# Patient Record
Sex: FEMALE | Race: BLACK OR AFRICAN AMERICAN
[De-identification: names, ages, dates, MRNs, and addresses within clinical notes are randomized per-mention and may not be internally consistent; named-entity substitution may affect disease eponyms.]

---

## 2023-05-24 ENCOUNTER — HOSPITAL ENCOUNTER (EMERGENCY)
Dept: HOSPITAL 15 - ER | Age: 23
Discharge: LEFT BEFORE DISCHARGE | End: 2023-05-24
Payer: COMMERCIAL

## 2023-05-24 DIAGNOSIS — F32.9: ICD-10-CM

## 2023-05-24 DIAGNOSIS — F41.9: Primary | ICD-10-CM

## 2023-05-24 PROCEDURE — 80320 DRUG SCREEN QUANTALCOHOLS: CPT

## 2023-05-24 PROCEDURE — 36415 COLL VENOUS BLD VENIPUNCTURE: CPT

## 2023-05-29 ENCOUNTER — HOSPITAL ENCOUNTER (EMERGENCY)
Dept: HOSPITAL 15 - ER | Age: 23
Discharge: HOME | End: 2023-05-29
Payer: MEDICAID

## 2023-05-29 VITALS — BODY MASS INDEX: 20.42 KG/M2 | WEIGHT: 130.07 LBS | HEIGHT: 67 IN

## 2023-05-29 VITALS — SYSTOLIC BLOOD PRESSURE: 107 MMHG | DIASTOLIC BLOOD PRESSURE: 71 MMHG

## 2023-05-29 DIAGNOSIS — R30.0: Primary | ICD-10-CM

## 2023-05-29 DIAGNOSIS — Z79.899: ICD-10-CM

## 2023-05-29 DIAGNOSIS — Z88.2: ICD-10-CM

## 2023-05-29 DIAGNOSIS — F32.9: ICD-10-CM

## 2023-05-29 DIAGNOSIS — F41.9: ICD-10-CM

## 2023-05-29 PROCEDURE — 81025 URINE PREGNANCY TEST: CPT

## 2023-05-29 PROCEDURE — 81001 URINALYSIS AUTO W/SCOPE: CPT

## 2024-12-28 ENCOUNTER — HOSPITAL ENCOUNTER (EMERGENCY)
Dept: HOSPITAL 15 - ER | Age: 24
LOS: 1 days | Discharge: HOME | End: 2024-12-29
Payer: MEDICAID

## 2024-12-28 VITALS
OXYGEN SATURATION: 100 % | RESPIRATION RATE: 18 BRPM | HEART RATE: 101 BPM | DIASTOLIC BLOOD PRESSURE: 75 MMHG | SYSTOLIC BLOOD PRESSURE: 112 MMHG

## 2024-12-28 VITALS — WEIGHT: 119.05 LBS | BODY MASS INDEX: 19.13 KG/M2 | HEIGHT: 66 IN

## 2024-12-28 DIAGNOSIS — Z79.2: ICD-10-CM

## 2024-12-28 DIAGNOSIS — N39.0: ICD-10-CM

## 2024-12-28 DIAGNOSIS — Z79.899: ICD-10-CM

## 2024-12-28 DIAGNOSIS — F41.9: Primary | ICD-10-CM

## 2024-12-28 DIAGNOSIS — Z59.00: ICD-10-CM

## 2024-12-28 LAB — PROT UR STRIP.AUTO-MCNC: (no result) MG/DL

## 2024-12-28 PROCEDURE — 81001 URINALYSIS AUTO W/SCOPE: CPT

## 2024-12-28 PROCEDURE — 81025 URINE PREGNANCY TEST: CPT

## 2024-12-28 SDOH — ECONOMIC STABILITY - HOUSING INSECURITY: HOMELESSNESS UNSPECIFIED: Z59.00

## 2024-12-28 NOTE — ED.PDOC
History of Present Illness


HPI Comments


A 24 year old female presents to the ED with a chief complaint of sore throat 

onset 2 weeks. Patient states she has been experiencing sore throat and body 

aches for the past 2 weeks. Patient is also concerned for possible STD, states 

her skin turns brown and dry when she has an STD. Patient's LMP was 5 days ago 

and is concerned for possible pregnancy. Past medical history and anxiety and 

depression. Denies fever, cough, chest pain, shortness of breath, diarrhea, 

nausea, vomiting, constipation. No other symptoms or modifying factors present 

at this time.


Time Seen by MD:  17:58


Primary Care Provider:  SARITA


Reviewed Notes:  Medications, Allergies


Allergies:  


Coded Allergies:  


     NO KNOWN ALLERGIES (Unverified , 8/12/21)


Home Meds


Active Scripts


Naproxen (Naproxen) 500 Mg Tab, 500 MG PO BID, #24 TAB


   Prov:ODETTE FREIRE         9/28/22


Sulfamethoxazole W/Trimethopri (Bactrim Ds Tablet) 1 Tab Tb, 1 TAB PO BID for 8 

Days, #16 TAB


   Prov:ODETTE FREIRE         9/28/22


Hydroxyzine Pamoate (Vistaril) 50 Mg Cap, 50 MG PO QHSP PRN, #20 CAP


   Prov:ODETTE FREIRE         3/12/22


Doxycycline (Monohydrate) (DOXYCYCLINE) 150 Mg Cap, 150 MG PO BID for 7 Days, 

#14 CAP 0 Refills


   Prov:TYE JIMÉNEZ [facility]         2/27/22


Information Source:  Patient


Mode of Arrival:  Ambulatory


Severity:  Moderate


Timing:  Weeks


Duration:  Since onset


Prehospital treatment:  None





Past Medical History


PAST MEDICAL HISTORY:  Anxiety, Depression


Surgical History:  Denies all surgeries


GYN History:  No Pertinent GYN History





Family History


Family History:  Unknown





Social History


Smoker:  Non-Smoker


Alcohol:  Denies ETOH Use


Drugs:  Denies Drug Use


Lives In:  Homeless





Constitutional:  reports: others (body aches); denies: chills, diaphoresis, 

fatigue, fever, malaise, sweats, weakness


EENTM:  reports: throat pain; denies: blurred vision, double vision, ear 

bleeding, ear discharge, ear drainage, ear pain, ear ringing, eye pain, eye red

ness, hearing loss, mouth pain, mouth swelling, nasal discharge, nose bleeding, 

nose congestion, nose pain, photophobia, tearing, throat swelling, voice 

changes, others


Respiratory:  denies: cough, hemoptysis, orthopnea, SOB at rest, shortness of 

breath, SOB with excertion, stridor, wheezing, others


Cardiovascular:  denies: chest pain, dizzy spells, diaphoresis, Dyspnea on 

exertion, edema, irregular heart beat, left arm pain, lightheadedness, 

palpitations, PND, syncope, others


Gastrointestinal:  denies: abdomen distended, abdominal pain, blood streaked 

bowels, constipated, diarrhea, dysphagia, difficulty swallowing, hematemesis, 

melena, nausea, poor appetite, poor fluid intake, rectal bleeding, rectal pain, 

vomiting, others


Genitourinary:  reports: vagina discharge; denies: abnormal vagina bleeding, 

burning, dyspareunia, dysuria, flank pain, frequency, hematuria, incontinence, 

pain, pregnant, urgency, others


Neurological:  denies: dizziness, fainting, headache, left sided numbness, left 

sided weakness, numbness, paresthesia, pre-existing deficit, right sided 

numbness, right sided weakness, seizure, speech problems, tingling, tremors, we

akness, others


Musculoskeletal:  denies: back pain, gout, joint pain, joint swelling, muscle 

pain, muscle stiffness, neck pain, others


Integumetry:  denies: bruises, change in color, change in hair/nails, dryness, 

laceration, lesions, lumps, rash, wounds, others


Allergic/Immunocompromised:  denies: Difficulty Healing, Frequent Infections, 

Hives, Itching, others


Hematologic/Lymphatic:  denies: anemia, blood clots, easy bleeding, easy 

bruising, swollen glands, others


Endocrine:  denies: excessive hunger, excessive sweating, excessive thirst, 

excessive urination, flushing, intolerance to cold, intolerance to heat, 

unexplained weight gain, unexplained weight loss, others


Psychiatric:  denies: anxiety, bipolar disorder, depression, hopeless, panic 

disorder, schizophrenia, sleepless, suicidal, others


All Other Systems:  Reviewed and Negative





Physical Exam


General Appearance:  No Apparent Distress, Normal


HEENT:  Normal ENT Inspection, Pharynx Normal, TMs Normal


Neck:  Full Range of Motion, Non-Tender, Normal, Normal Inspection


Respiratory:  Chest Non-Tender, Lungs Clear, No Accessory Muscle Use, No 

Respiratory Distress, Normal Breath Sounds


Cardiovascular:  No Edema, No JVD, No Murmur, No Gallop, Normal Peripheral 

Pulses, Regular Rate/Rhythm


Breast Exam:  Deferred


Gastrointestinal:  No Organomegaly, Non Tender, No Pulsatile Mass, Normal Bowel 

Sounds, Soft


Genitalia:  Deferred


Pelvic:  Deferred


Rectal:  Deferred


Extremities:  No calf tenderness, Normal capillary refill, Normal inspection, 

Normal range of motion, Non-tender, No pedal edema


Musculoskeletal :  


   Apperance:  Normal


Neurologic:  Alert, CNs II-XII nml as Tested, No Motor Deficits, Normal Affect, 

Normal Mood, No Sensory Deficits


Cerebellar Function:  Normal


Reflexes:  Normal


Skin:  Dry, Normal Color, Warm


Lymphatic:  No Adenopathy





Was a procedure done?


Was a procedure done?:  No





Differential Dx


Considerations may include:


sti, anxiety, pregnancy, uti, viral syndrome





X-Ray, Labs, Meds, VS





                                   Vital Signs








  Date Time  Temp Pulse Resp B/P (MAP) Pulse Ox O2 Delivery O2 Flow Rate FiO2


 


12/28/24 18:05 98.1 101 18 112/75 (87) 100   








                                       Lab








Test


 12/28/24


18:32 Range/Units


 


 


Urine Color Yellow  Yellow  


 


Urine Clarity Clear  Clear  


 


Urine pH 6.0  5.0-9.0  


 


Urine Specific Gravity 1.033  1.001-1.035  


 


Urine Protein Trace H Negative  


 


Urine Ketones Trace  Negative  


 


Urine Blood 2+ H Negative  /uL


 


Urine Nitrite Negative  Negative  


 


Urine Bilirubin Negative  Negative  


 


Urine Urobilinogen 2 H Negative  mg/dL


 


Urine Leukocyte Esterase Trace  Negative  /uL


 


Urine RBC 6  0 - 4  /hpf


 


Urine WBC 5  0 - 5  /hpf


 


Urine Squamous Epithelial


Cells Few 


 <5  /hpf





 


Urine Bacteria Few H None Seen  /hpf


 


Urine Mucus Few  None Seen  


 


Urine Glucose Normal  Normal  mg/dL


 


Urine Pregnancy Test See comment A Negative  


 


Chlamydia trachomatis (OUSMANE) Pending   


 


Neisseria gonorrhoeae (OUSMANE) Pending   








Time of 1ST Reevaluation:  18:28


Reevaluation 1ST:  Unchanged


Patient Education/Counseling:  Diagnosis, Treatment, Prognosis, Need For Follow 

Up


Family Education/Counseling:  No Family Present


Additional Information


I reviewed the following notes from patient's past medical encounters:





The following tests were ordered, and results were reviewed by me: UA, PREGUA, 

Chlamydia/GC amplification





I discussed treatment and results with medical personnel and: patient





pt's symptoms are atypical for being pregnant. her hcg is equivocal. she will 

need a repeat in 48 hours. she also does  not have any symptoms of sti, which 

she is concerned about, because her skin"turns brown." the tests are send 

outs.we will contact her if they are positive. she likely has a viral infection 

causing her symptoms. her trace uti should be minimally symptomatic. she will be

prescribed keflex





Departure 1


Departure


Time of Disposition:  23:01


Impression:  


   Primary Impression:  


   Anxiety


   Additional Impression:  


   UTI (urinary tract infection)


   Qualified Codes:  N30.00 - Acute cystitis without hematuria


Disposition:  01 HOME / SELF CARE / HOMELESS


Condition:  Good





Additional Instructions:  


have your pregnancy test rechecked in 48 hours.


e-Prescriptions


Cephalexin Monohydrate (Cephalexin) 500 Mg Tab


1 TAB PO QID for 3 Days, #12 TAB


   Prov: SARAH PATTERSON MD         12/28/24


Discharged With:  Self





Critical Care Note


Critical Care Time?:  No





Stability


Stability form required:  No








I personally scribed for SARAH [facility]SON [facility] on 12/28/24 at 18:07.  El

ectronically submitted by Becca Borges ([email or web address]).


I personally scribed for SARAH PATTERSON [facility] on 12/28/24 at 18:13.  Electr

onically submitted by Becca Borges ([email or web address]).





SARAH PATTERSON MD                Dec 28, 2024 18:07